# Patient Record
(demographics unavailable — no encounter records)

---

## 2024-10-04 NOTE — HISTORY OF PRESENT ILLNESS
[de-identified] : 49 years old female has past medical history of asthma, prediabetes, hyperlipidemia, hypothyroidism, overweight, GERD, and overweight, came back to review her most recent test results. Total cholesterol was 248, , vitamin D 27.7, UA positive for large leukocyte esterase, and occasional bacteria. Reports were reviewed with pt in details. Other blood tests came back wnl.

## 2024-11-26 NOTE — PHYSICAL EXAM
[de-identified] : Turbinate mildly swelling and erythema, no sinus tenderness [de-identified] : Umbilical area: clean, no erythematous, non tender and no odor

## 2024-11-26 NOTE — REVIEW OF SYSTEMS
[Nasal Discharge] : nasal discharge [Postnasal Drip] : postnasal drip [Diarrhea] : diarrhea [Vomiting] : no vomiting

## 2024-11-26 NOTE — HISTORY OF PRESENT ILLNESS
[de-identified] : 49 years old female has past medical history of asthma, prediabetes, hyperlipidemia, hypothyroidism, overweight, GERD, and overweight, came back to review her most recent test results. Total cholesterol was 202, , vitamin D 29.6, UA positive for large leukocyte esterase, WBC 12 and epithelial cells 16/HPF. Reports were reviewed with pt in details. Other blood tests came back wnl. Pt complained 1, odor and pain when she cleans her belly button. Otherwise she is fine. 2, nasal stuffiness and soreness when she used saline water to clean her nose. Pt wants to see ENT.

## 2024-11-26 NOTE — PHYSICAL EXAM
[de-identified] : Turbinate mildly swelling and erythema, no sinus tenderness [de-identified] : Umbilical area: clean, no erythematous, non tender and no odor

## 2024-11-26 NOTE — HISTORY OF PRESENT ILLNESS
[de-identified] : 49 years old female has past medical history of asthma, prediabetes, hyperlipidemia, hypothyroidism, overweight, GERD, and overweight, came back to review her most recent test results. Total cholesterol was 202, , vitamin D 29.6, UA positive for large leukocyte esterase, WBC 12 and epithelial cells 16/HPF. Reports were reviewed with pt in details. Other blood tests came back wnl. Pt complained 1, odor and pain when she cleans her belly button. Otherwise she is fine. 2, nasal stuffiness and soreness when she used saline water to clean her nose. Pt wants to see ENT.

## 2025-02-22 NOTE — PHYSICAL EXAM
[No Acute Distress] : no acute distress [Normal Sclera/Conjunctiva] : normal sclera/conjunctiva [PERRL] : pupils equal round and reactive to light [EOMI] : extraocular movements intact [Normal Outer Ear/Nose] : the outer ears and nose were normal in appearance [Normal Oropharynx] : the oropharynx was normal [No JVD] : no jugular venous distention [No Lymphadenopathy] : no lymphadenopathy [Supple] : supple [Thyroid Normal, No Nodules] : the thyroid was normal and there were no nodules present [No Respiratory Distress] : no respiratory distress  [No Accessory Muscle Use] : no accessory muscle use [Clear to Auscultation] : lungs were clear to auscultation bilaterally [Normal Rate] : normal rate  [Regular Rhythm] : with a regular rhythm [Normal S1, S2] : normal S1 and S2 [No Murmur] : no murmur heard [No Varicosities] : no varicosities [Pedal Pulses Present] : the pedal pulses are present [No Edema] : there was no peripheral edema [No Extremity Clubbing/Cyanosis] : no extremity clubbing/cyanosis [Soft] : abdomen soft [Non Tender] : non-tender [Non-distended] : non-distended [No Masses] : no abdominal mass palpated [No HSM] : no HSM [Normal Bowel Sounds] : normal bowel sounds [No CVA Tenderness] : no CVA  tenderness [No Spinal Tenderness] : no spinal tenderness [No Joint Swelling] : no joint swelling [Grossly Normal Strength/Tone] : grossly normal strength/tone [No Rash] : no rash [Coordination Grossly Intact] : coordination grossly intact [No Focal Deficits] : no focal deficits [Normal Gait] : normal gait [Deep Tendon Reflexes (DTR)] : deep tendon reflexes were 2+ and symmetric [Normal Affect] : the affect was normal [Normal Insight/Judgement] : insight and judgment were intact

## 2025-02-22 NOTE — HISTORY OF PRESENT ILLNESS
[de-identified] : 49 years old female has past medical history of asthma, prediabetes, hyperlipidemia, hypothyroidism, overweight, GERD, and overweight, came back for annual physical exam.

## 2025-02-26 NOTE — HISTORY OF PRESENT ILLNESS
[de-identified] : 49 years old female has past medical history of asthma, prediabetes, hyperlipidemia, hypothyroidism, overweight, GERD, and overweight, came back for annual physical exam.

## 2025-05-01 NOTE — HEALTH RISK ASSESSMENT
[Good] : ~his/her~  mood as  good [No] : In the past 12 months have you used drugs other than those required for medical reasons? No [0] : 2) Feeling down, depressed, or hopeless: Not at all (0) [PHQ-2 Negative - No further assessment needed] : PHQ-2 Negative - No further assessment needed [Yes] : Reviewed medication list for presence of high-risk medications. [Opioids] : opioids [Never] : Never [Patient reported mammogram was normal] : Patient reported mammogram was normal [Patient reported PAP Smear was normal] : Patient reported PAP Smear was normal [Patient reported colonoscopy was normal] : Patient reported colonoscopy was normal [[id number]] : 0 [MammogramDate] : 11/24 [PapSmearDate] : 2025 [ColonoscopyDate] : 3/2025

## 2025-05-01 NOTE — HISTORY OF PRESENT ILLNESS
[de-identified] : 49 years old female has past medical history of asthma, prediabetes, hyperlipidemia, hypothyroidism, overweight, GERD, and overweight, came back for annual physical exam.  Pt had blood and urine tests recently. Total cholesterol was 223, , TSH 6.58. Reports were reviewed with pt in details. Other blood tests came back wnl.

## 2025-05-01 NOTE — END OF VISIT
[FreeTextEntry3] : Shingle vaccine recommended. Pt refused today Blood tests with GYN showed no immunity to hepatitis B. Pt refused hepatitis B vaccine today

## 2025-05-01 NOTE — HISTORY OF PRESENT ILLNESS
[de-identified] : 49 years old female has past medical history of asthma, prediabetes, hyperlipidemia, hypothyroidism, overweight, GERD, and overweight, came back for annual physical exam.  Pt had blood and urine tests recently. Total cholesterol was 223, , TSH 6.58. Reports were reviewed with pt in details. Other blood tests came back wnl.

## 2025-05-01 NOTE — HEALTH RISK ASSESSMENT
[Good] : ~his/her~  mood as  good [No] : In the past 12 months have you used drugs other than those required for medical reasons? No [0] : 2) Feeling down, depressed, or hopeless: Not at all (0) [PHQ-2 Negative - No further assessment needed] : PHQ-2 Negative - No further assessment needed [Yes] : Reviewed medication list for presence of high-risk medications. [Opioids] : opioids [Never] : Never [Patient reported mammogram was normal] : Patient reported mammogram was normal [Patient reported PAP Smear was normal] : Patient reported PAP Smear was normal [Patient reported colonoscopy was normal] : Patient reported colonoscopy was normal [[id number]] : 0 [MammogramDate] : 11/24 [PapSmearDate] : 2025 [ColonoscopyDate] : 3/2025